# Patient Record
Sex: MALE | Race: WHITE | Employment: FULL TIME | URBAN - METROPOLITAN AREA
[De-identification: names, ages, dates, MRNs, and addresses within clinical notes are randomized per-mention and may not be internally consistent; named-entity substitution may affect disease eponyms.]

---

## 2020-10-24 ENCOUNTER — HOSPITAL ENCOUNTER (EMERGENCY)
Facility: CLINIC | Age: 35
Discharge: HOME | End: 2020-10-24
Attending: EMERGENCY MEDICINE | Admitting: EMERGENCY MEDICINE
Payer: COMMERCIAL

## 2020-10-24 ENCOUNTER — APPOINTMENT (OUTPATIENT)
Dept: CT IMAGING | Facility: CLINIC | Age: 35
End: 2020-10-24
Attending: EMERGENCY MEDICINE
Payer: COMMERCIAL

## 2020-10-24 VITALS
HEART RATE: 63 BPM | BODY MASS INDEX: 18.37 KG/M2 | TEMPERATURE: 98.7 F | OXYGEN SATURATION: 98 % | DIASTOLIC BLOOD PRESSURE: 66 MMHG | RESPIRATION RATE: 13 BRPM | HEIGHT: 70 IN | SYSTOLIC BLOOD PRESSURE: 123 MMHG

## 2020-10-24 DIAGNOSIS — H65.93 MIDDLE EAR EFFUSION, BILATERAL: ICD-10-CM

## 2020-10-24 DIAGNOSIS — G50.0 TRIGEMINAL NEURALGIA: ICD-10-CM

## 2020-10-24 LAB
ANION GAP SERPL CALCULATED.3IONS-SCNC: 5 MMOL/L (ref 3–14)
BASOPHILS # BLD AUTO: 0.1 10E9/L (ref 0–0.2)
BASOPHILS NFR BLD AUTO: 0.6 %
BUN SERPL-MCNC: 18 MG/DL (ref 7–30)
CALCIUM SERPL-MCNC: 9 MG/DL (ref 8.5–10.1)
CHLORIDE SERPL-SCNC: 104 MMOL/L (ref 94–109)
CO2 SERPL-SCNC: 28 MMOL/L (ref 20–32)
CREAT SERPL-MCNC: 1.15 MG/DL (ref 0.66–1.25)
DIFFERENTIAL METHOD BLD: NORMAL
EOSINOPHIL # BLD AUTO: 0.3 10E9/L (ref 0–0.7)
EOSINOPHIL NFR BLD AUTO: 3.5 %
ERYTHROCYTE [DISTWIDTH] IN BLOOD BY AUTOMATED COUNT: 12.6 % (ref 10–15)
GFR SERPL CREATININE-BSD FRML MDRD: 83 ML/MIN/{1.73_M2}
GLUCOSE SERPL-MCNC: 93 MG/DL (ref 70–99)
HCT VFR BLD AUTO: 44 % (ref 40–53)
HGB BLD-MCNC: 15.2 G/DL (ref 13.3–17.7)
IMM GRANULOCYTES # BLD: 0 10E9/L (ref 0–0.4)
IMM GRANULOCYTES NFR BLD: 0.2 %
LYMPHOCYTES # BLD AUTO: 2.6 10E9/L (ref 0.8–5.3)
LYMPHOCYTES NFR BLD AUTO: 27.7 %
MCH RBC QN AUTO: 30 PG (ref 26.5–33)
MCHC RBC AUTO-ENTMCNC: 34.5 G/DL (ref 31.5–36.5)
MCV RBC AUTO: 87 FL (ref 78–100)
MONOCYTES # BLD AUTO: 0.6 10E9/L (ref 0–1.3)
MONOCYTES NFR BLD AUTO: 6.3 %
NEUTROPHILS # BLD AUTO: 5.9 10E9/L (ref 1.6–8.3)
NEUTROPHILS NFR BLD AUTO: 61.7 %
NRBC # BLD AUTO: 0 10*3/UL
NRBC BLD AUTO-RTO: 0 /100
PLATELET # BLD AUTO: 262 10E9/L (ref 150–450)
POTASSIUM SERPL-SCNC: 3.9 MMOL/L (ref 3.4–5.3)
RBC # BLD AUTO: 5.06 10E12/L (ref 4.4–5.9)
SODIUM SERPL-SCNC: 137 MMOL/L (ref 133–144)
WBC # BLD AUTO: 9.5 10E9/L (ref 4–11)

## 2020-10-24 PROCEDURE — 85025 COMPLETE CBC W/AUTO DIFF WBC: CPT | Performed by: EMERGENCY MEDICINE

## 2020-10-24 PROCEDURE — 80048 BASIC METABOLIC PNL TOTAL CA: CPT | Performed by: EMERGENCY MEDICINE

## 2020-10-24 PROCEDURE — 99284 EMERGENCY DEPT VISIT MOD MDM: CPT | Mod: 25

## 2020-10-24 PROCEDURE — 70450 CT HEAD/BRAIN W/O DYE: CPT

## 2020-10-24 RX ORDER — CARBAMAZEPINE 200 MG/1
200 TABLET ORAL 2 TIMES DAILY
Qty: 60 TABLET | Refills: 0 | Status: SHIPPED | OUTPATIENT
Start: 2020-10-24 | End: 2020-11-23

## 2020-10-24 RX ORDER — CETIRIZINE HYDROCHLORIDE, PSEUDOEPHEDRINE HYDROCHLORIDE 5; 120 MG/1; MG/1
1 TABLET, FILM COATED, EXTENDED RELEASE ORAL 2 TIMES DAILY
Qty: 30 TABLET | Refills: 0 | Status: SHIPPED | OUTPATIENT
Start: 2020-10-24 | End: 2020-11-08

## 2020-10-24 ASSESSMENT — ENCOUNTER SYMPTOMS
NUMBNESS: 1
NERVOUS/ANXIOUS: 1
MYALGIAS: 1
SPEECH DIFFICULTY: 0
FEVER: 0
NECK STIFFNESS: 1
FACIAL ASYMMETRY: 0
HEADACHES: 1

## 2020-10-24 NOTE — ED AVS SNAPSHOT
[facility] Emergency Dept  [address] [address]  Phone: [phone]  Fax: [phone]                                    Tigre Astudillo   MRN: [id number]    Department: [facility] Emergency Dept   Date of Visit: 10/24/2020           After Visit Summary Signature Page    I have received my discharge instructions, and my questions have been answered. I have discussed any challenges I see with this plan with the nurse or doctor.    ..........................................................................................................................................  Patient/Patient Representative Signature      ..........................................................................................................................................  Patient Representative Print Name and Relationship to Patient    ..................................................               ................................................  Date                                   Time    ..........................................................................................................................................  Reviewed by Signature/Title    ...................................................              ..............................................  Date                                               Time          22EPIC Rev 08/18

## 2020-10-25 NOTE — ED PROVIDER NOTES
"  History     Chief Complaint:  Numbness      HPI   Tigre Astudillo is a 34 year old male with a history of anxiety who presents for the evaluation of numbness. The patient reports that one week ago he started to experience left sided facial numbness that radiated above his left eyebrow and behind his left ear. The patient notes that with these feelings of numbness and tingling, he experienced left sided neck stiffness. He states that these numbness and stiffness symptoms returned today, prompting him to the ED. The patient describes that his numbness will also intermittently radiate down into his left arm and his left heal. Patient also endorses tooth pain which he attributes to a recent root canal and reduced hearing in his left ear and body aches for the past week. No known sick contacts. The patient denies fever, speech changes, vision changes, and other issues.      Allergies:  No known drug allergies      Medications:    Zoloft  Desyrel     Past Medical History:    Insomnia  Asthma  Pneumonia   Poly-substance abuse  Anxiety  Condyloma acuminatum    Past Surgical History:    History reviewed. No pertinent surgical history.     Family History:    Hypertension and lipids - father     Social History:  Smoking status: Former smoker, quit date: 3/2/2012  Alcohol use: No  Drug use: Marijuana   PCP: No primary care provider on file.   Marital Status:  Single [1]     Review of Systems   Constitutional: Negative for fever.   Eyes: Negative for visual disturbance.   Musculoskeletal: Positive for myalgias and neck stiffness.   Neurological: Positive for numbness and headaches. Negative for facial asymmetry and speech difficulty.   Psychiatric/Behavioral: The patient is nervous/anxious.    All other systems reviewed and are negative.        Physical Exam     Patient Vitals for the past 24 hrs:   BP Temp Temp src Pulse Resp SpO2 Height   10/24/20 1925 (!) 157/94 98.7  F (37.1  C) Temporal 112 20 98 % 1.778 m (5' 10\")    "   Physical Exam  General: Alert, interactive in mild distress  Head:  Scalp is atraumatic  Eyes:  The pupils are equal, round, and reactive to light    EOM's intact    No scleral icterus  ENT:      Nose:  The external nose is normal  Ears:  External ears are normal. Bilateral middle ear effusion.   Mouth/Throat: The oropharynx is normal. Left upper tooth with signs of root canal, but no signs of active infection.     Mucus membranes are moist     Facial tenderness  Neck:  Normal range of motion.      There is no rigidity.    Trachea is in the midline         CV:  Regular rate and rhythm    No murmur   Resp:  Breath sounds are clear bilaterally    Non-labored, no retractions or accessory muscle use      MS:  Normal strength in all 4 extremities  Skin:  Warm and dry, No rash or lesions noted.  Neuro: Strength 5/5 x4.  Sensation intact  In all 4 extremities.      Cranial nerves 2-12 grossly intact.    GCS: 15  Psych:  Awake. Alert.  Normal affect.      Appropriate interactions.    [employer] Stroke Scale  Exam Interval: Baseline   Score    Level of consciousness: (0)   Alert, keenly responsive    LOC questions: (0)   Answers both questions correctly    LOC commands: (0)   Performs both tasks correctly    Best gaze: (0)   Normal    Visual: (0)   No visual loss    Facial palsy: (0)   Normal symmetrical movements    Motor arm (left): (0)   No drift    Motor arm (right): (0)   No drift    Motor leg (left): (0)   No drift    Motor leg (right): (0)   No drift    Limb ataxia: (0)   Absent    Sensory: (0)   Normal- no sensory loss    Best language: (0)   Normal- no aphasia    Dysarthria: (0)   Normal    Extinction and inattention: (0)   No abnormality        Total Score:  0      Emergency Department Course   Imaging:  Radiographic findings were communicated with the patient who voiced understanding of the findings.  CT Head w/o Contrast   IMPRESSION: No evidence of acute intracranial hemorrhage, mass,  or  herniation.  As read by Radiology.    Laboratory:  CBC: WNL (WBC 9.5, HGB 15.2, )  CMP: WNL (Creatinine 1.15)     Emergency Department Course:  Past medical records, nursing notes, and vitals reviewed.  1932: I performed an exam of the patient and obtained history, as documented above.     The patient was sent for a head CT while in the emergency department, findings above.    IV inserted and blood drawn.    2130: I rechecked the patient. Explained findings to patient.     Findings and plan explained to the Patient. Patient discharged home with instructions regarding supportive care, medications, and reasons to return. The importance of close follow-up was reviewed. The patient was prescribed carbamazepine and Zyrtec.       Impression & Plan    Medical Decision Making:  Tigre Astudillo is a 34 year old male who was seen and evaluated. He presents with facial discomfort. CT imaging demonstrates no signs of sinusitis, tumor, intracranial hemorrhage, or other concerning illness. There is no focal neurologic deficits to suggest stroke. No signs of sinusitis on examination. He does have middle ear effusion, I will place him on an antihistamine and decongestant for this. Given the description of his pain, I will empirically treat for trigeminal neuralgia as well. I see no indication for antibiotics. I have recommended follow up with his PCP and return to the ED if any new symptoms develop. Patient was in agreement of plan and was subsequently discharged to home.     Diagnosis:    ICD-10-CM    1. Trigeminal neuralgia  G50.0    2. Middle ear effusion, bilateral  H65.93        Disposition:  Discharged to home with carbamazepine and Zyrtec.    Discharge Medications:  New Prescriptions    CARBAMAZEPINE (TEGRETOL) 200 MG TABLET    Take 1 tablet (200 mg) by mouth 2 times daily    CETIRIZINE-PSEUDOEPHEDRINE ER (ZYRTEC-D) 5-120 MG 12 HR TABLET    Take 1 tablet by mouth 2 times daily for 15 days     Scribe Disclosure:  I  Byron Keane, am serving as a scribe at 7:30 PM on 10/24/2020 to document services personally performed by Gaetano Sargent MD based on my observations and the provider's statements to me.      Byron Keane  10/24/2020   [facility] EMERGENCY DEPT       Gaetano Sargent MD  10/24/20 [id number]

## 2020-10-25 NOTE — ED TRIAGE NOTES
Pt saw his PCP 1 week ago for numbness on the left side of his face. Numbness has been coming and going. Pressure in the left side of his face that radiates into his ear and reports his neck is stiff and tense at times